# Patient Record
Sex: FEMALE | Race: WHITE | NOT HISPANIC OR LATINO
[De-identification: names, ages, dates, MRNs, and addresses within clinical notes are randomized per-mention and may not be internally consistent; named-entity substitution may affect disease eponyms.]

---

## 2019-02-20 ENCOUNTER — APPOINTMENT (OUTPATIENT)
Dept: GYNECOLOGIC ONCOLOGY | Facility: CLINIC | Age: 54
End: 2019-02-20
Payer: COMMERCIAL

## 2019-02-20 VITALS
SYSTOLIC BLOOD PRESSURE: 142 MMHG | BODY MASS INDEX: 39.93 KG/M2 | HEIGHT: 62 IN | OXYGEN SATURATION: 97 % | WEIGHT: 217 LBS | RESPIRATION RATE: 16 BRPM | DIASTOLIC BLOOD PRESSURE: 81 MMHG | HEART RATE: 93 BPM

## 2019-02-20 DIAGNOSIS — Z80.41 FAMILY HISTORY OF MALIGNANT NEOPLASM OF OVARY: ICD-10-CM

## 2019-02-20 DIAGNOSIS — F17.200 NICOTINE DEPENDENCE, UNSPECIFIED, UNCOMPLICATED: ICD-10-CM

## 2019-02-20 DIAGNOSIS — Z80.3 FAMILY HISTORY OF MALIGNANT NEOPLASM OF BREAST: ICD-10-CM

## 2019-02-20 DIAGNOSIS — Z83.3 FAMILY HISTORY OF DIABETES MELLITUS: ICD-10-CM

## 2019-02-20 DIAGNOSIS — R14.0 ABDOMINAL DISTENSION (GASEOUS): ICD-10-CM

## 2019-02-20 DIAGNOSIS — Z86.2 PERSONAL HISTORY OF DISEASES OF THE BLOOD AND BLOOD-FORMING ORGANS AND CERTAIN DISORDERS INVOLVING THE IMMUNE MECHANISM: ICD-10-CM

## 2019-02-20 DIAGNOSIS — N83.299 OTHER OVARIAN CYST, UNSPECIFIED SIDE: ICD-10-CM

## 2019-02-20 DIAGNOSIS — Z86.59 PERSONAL HISTORY OF OTHER MENTAL AND BEHAVIORAL DISORDERS: ICD-10-CM

## 2019-02-20 PROCEDURE — 99245 OFF/OP CONSLTJ NEW/EST HI 55: CPT | Mod: 25

## 2019-02-20 PROCEDURE — 76830 TRANSVAGINAL US NON-OB: CPT | Mod: 59

## 2019-02-20 PROCEDURE — 93976 VASCULAR STUDY: CPT | Mod: 59

## 2019-02-20 PROCEDURE — 76857 US EXAM PELVIC LIMITED: CPT | Mod: 59

## 2019-02-20 RX ORDER — ELASTIC BANDAGE 2"X2.2YD
BANDAGE TOPICAL
Refills: 0 | Status: ACTIVE | COMMUNITY

## 2019-02-20 RX ORDER — DIVALPROEX SODIUM 500 MG/1
TABLET, DELAYED RELEASE ORAL
Refills: 0 | Status: ACTIVE | COMMUNITY

## 2019-02-21 NOTE — END OF VISIT
[FreeTextEntry3] : Written by Crystal CHOW, acting as a scribe for Dr. Jenna Zhao.\par This note accurately reflects the work and decisions made by me.\par \par

## 2019-02-21 NOTE — ASSESSMENT
[FreeTextEntry1] : Discussed with the patient that I am highly suspicious of an ovarian malignancy. Her sonogram in my office today shows bilateral complex ovarian masses. I plan on ordering tumor markers as well as a baseline Ctscan. Discussed the need for surgery. Risks and benefits discussed.  \par \par I discussed at length with the patient the nature, purpose, risks, benefits, and alternatives of total abdominal hysterectomy and bilateral salpingo-oophorectomy via a vertical, midline incision, with bilateral pelvic and para-aortic lymphadenectomy and surgical staging (including possible omentectomy).  The patient understands the risks to include (but not be limited to) bowel injury, bleeding (with the possible need for transfusion), bladder or ureteral injury, infections, protracted wound closure, deep venous thrombosis, and kiran-operative death.  She is also aware of  the possibility of  a unrecognized surgical complication with need for subsequent re-exploration.  She also understands the rationale for surgical procedures such as omentectomy, or pelvic and para-aortic lymphadenectomy for the proper staging of a gynecological cancer.  She agrees to proceed.  She asked numerous questions which were answered to her satisfaction.  She understands the need for a pre-operative bowel preparation and agrees to comply with our instructions.

## 2019-02-21 NOTE — PHYSICAL EXAM
[Normal] : Bimanual Exam: Normal [de-identified] : Patient was interviewed and examined with chaperone present. Name of chaperone: Crystal Escoto

## 2019-02-21 NOTE — HISTORY OF PRESENT ILLNESS
[FreeTextEntry1] : This 52yo  LMP 2018 with history of triple negative breast cancer 14 years ago s/p chemotherapy and bilateral mastectomy. Patient is BRCA 1 positive and is referred here for evaluation of ovarian cysts. Patient desires a hysterectomy, BSO. Patient has been experiencing abdominal bloating which began about 2 months ago. Pelvic sonogram in 2019 showed a left ovary hyperechoic/solid area. Patient reports having a period in Dec 2018 after one year of no menses. \par \par Pap [id number] abnormal paps or HPV\par Colonoscopy-never\par Bone density-osteopenia

## 2019-02-21 NOTE — CHIEF COMPLAINT
[FreeTextEntry1] : [facility]\par \par [facility] [phone] at [address] [place] [phone]\par

## 2019-02-26 ENCOUNTER — MOBILE ON CALL (OUTPATIENT)
Age: 54
End: 2019-02-26

## 2019-02-28 ENCOUNTER — OUTPATIENT (OUTPATIENT)
Dept: OUTPATIENT SERVICES | Facility: HOSPITAL | Age: 54
LOS: 1 days | End: 2019-02-28
Payer: COMMERCIAL

## 2019-02-28 VITALS
HEIGHT: 62 IN | SYSTOLIC BLOOD PRESSURE: 159 MMHG | TEMPERATURE: 98 F | WEIGHT: 213.85 LBS | DIASTOLIC BLOOD PRESSURE: 80 MMHG | HEART RATE: 85 BPM | RESPIRATION RATE: 16 BRPM

## 2019-02-28 DIAGNOSIS — C50.919 MALIGNANT NEOPLASM OF UNSPECIFIED SITE OF UNSPECIFIED FEMALE BREAST: ICD-10-CM

## 2019-02-28 DIAGNOSIS — Z29.9 ENCOUNTER FOR PROPHYLACTIC MEASURES, UNSPECIFIED: ICD-10-CM

## 2019-02-28 DIAGNOSIS — Z01.818 ENCOUNTER FOR OTHER PREPROCEDURAL EXAMINATION: ICD-10-CM

## 2019-02-28 DIAGNOSIS — Z90.49 ACQUIRED ABSENCE OF OTHER SPECIFIED PARTS OF DIGESTIVE TRACT: Chronic | ICD-10-CM

## 2019-02-28 DIAGNOSIS — N83.299 OTHER OVARIAN CYST, UNSPECIFIED SIDE: ICD-10-CM

## 2019-02-28 DIAGNOSIS — Z90.13 ACQUIRED ABSENCE OF BILATERAL BREASTS AND NIPPLES: Chronic | ICD-10-CM

## 2019-02-28 LAB
ANION GAP SERPL CALC-SCNC: 11 MMOL/L — SIGNIFICANT CHANGE UP (ref 5–17)
BASOPHILS # BLD AUTO: 0 K/UL — SIGNIFICANT CHANGE UP (ref 0–0.2)
BASOPHILS NFR BLD AUTO: 0.5 % — SIGNIFICANT CHANGE UP (ref 0–2)
BLD GP AB SCN SERPL QL: SIGNIFICANT CHANGE UP
BUN SERPL-MCNC: 17 MG/DL — SIGNIFICANT CHANGE UP (ref 8–20)
CALCIUM SERPL-MCNC: 9.3 MG/DL — SIGNIFICANT CHANGE UP (ref 8.6–10.2)
CHLORIDE SERPL-SCNC: 99 MMOL/L — SIGNIFICANT CHANGE UP (ref 98–107)
CO2 SERPL-SCNC: 28 MMOL/L — SIGNIFICANT CHANGE UP (ref 22–29)
CREAT SERPL-MCNC: 0.69 MG/DL — SIGNIFICANT CHANGE UP (ref 0.5–1.3)
EOSINOPHIL # BLD AUTO: 0.1 K/UL — SIGNIFICANT CHANGE UP (ref 0–0.5)
EOSINOPHIL NFR BLD AUTO: 1.1 % — SIGNIFICANT CHANGE UP (ref 0–6)
GLUCOSE SERPL-MCNC: 93 MG/DL — SIGNIFICANT CHANGE UP (ref 70–115)
HBA1C BLD-MCNC: 6 % — HIGH (ref 4–5.6)
HCT VFR BLD CALC: 41.8 % — SIGNIFICANT CHANGE UP (ref 37–47)
HGB BLD-MCNC: 13.4 G/DL — SIGNIFICANT CHANGE UP (ref 12–16)
LYMPHOCYTES # BLD AUTO: 2.3 K/UL — SIGNIFICANT CHANGE UP (ref 1–4.8)
LYMPHOCYTES # BLD AUTO: 35.9 % — SIGNIFICANT CHANGE UP (ref 20–55)
MCHC RBC-ENTMCNC: 31.8 PG — HIGH (ref 27–31)
MCHC RBC-ENTMCNC: 32.1 G/DL — SIGNIFICANT CHANGE UP (ref 32–36)
MCV RBC AUTO: 99.3 FL — HIGH (ref 81–99)
MONOCYTES # BLD AUTO: 0.7 K/UL — SIGNIFICANT CHANGE UP (ref 0–0.8)
MONOCYTES NFR BLD AUTO: 10.4 % — HIGH (ref 3–10)
NEUTROPHILS # BLD AUTO: 3.3 K/UL — SIGNIFICANT CHANGE UP (ref 1.8–8)
NEUTROPHILS NFR BLD AUTO: 51.8 % — SIGNIFICANT CHANGE UP (ref 37–73)
PLATELET # BLD AUTO: 287 K/UL — SIGNIFICANT CHANGE UP (ref 150–400)
POTASSIUM SERPL-MCNC: 4.9 MMOL/L — SIGNIFICANT CHANGE UP (ref 3.5–5.3)
POTASSIUM SERPL-SCNC: 4.9 MMOL/L — SIGNIFICANT CHANGE UP (ref 3.5–5.3)
RBC # BLD: 4.21 M/UL — LOW (ref 4.4–5.2)
RBC # FLD: 13.5 % — SIGNIFICANT CHANGE UP (ref 11–15.6)
SODIUM SERPL-SCNC: 138 MMOL/L — SIGNIFICANT CHANGE UP (ref 135–145)
TYPE + AB SCN PNL BLD: SIGNIFICANT CHANGE UP
WBC # BLD: 6.3 K/UL — SIGNIFICANT CHANGE UP (ref 4.8–10.8)
WBC # FLD AUTO: 6.3 K/UL — SIGNIFICANT CHANGE UP (ref 4.8–10.8)

## 2019-02-28 PROCEDURE — 86850 RBC ANTIBODY SCREEN: CPT

## 2019-02-28 PROCEDURE — 83036 HEMOGLOBIN GLYCOSYLATED A1C: CPT

## 2019-02-28 PROCEDURE — 85027 COMPLETE CBC AUTOMATED: CPT

## 2019-02-28 PROCEDURE — 80048 BASIC METABOLIC PNL TOTAL CA: CPT

## 2019-02-28 PROCEDURE — 86900 BLOOD TYPING SEROLOGIC ABO: CPT

## 2019-02-28 PROCEDURE — 93005 ELECTROCARDIOGRAM TRACING: CPT

## 2019-02-28 PROCEDURE — G0463: CPT

## 2019-02-28 PROCEDURE — 86901 BLOOD TYPING SEROLOGIC RH(D): CPT

## 2019-02-28 PROCEDURE — 36415 COLL VENOUS BLD VENIPUNCTURE: CPT

## 2019-02-28 PROCEDURE — 93010 ELECTROCARDIOGRAM REPORT: CPT

## 2019-02-28 NOTE — H&P PST ADULT - PROBLEM SELECTOR PLAN 1
Total abdominal hysterectomy, bilateral salpingo oophorectomy omentectomy, pelvic para aortic lymphadenectomy staging.

## 2019-02-28 NOTE — H&P PST ADULT - FAMILY HISTORY
Sibling  Still living? Yes, Estimated age: 51-60  Family history of ovarian cancer, Age at diagnosis: 41-50  Family history of diabetes mellitus, Age at diagnosis: 41-50     Father  Still living? No  Family history of diabetes mellitus, Age at diagnosis: Age Unknown     Sibling  Still living? No  Family history of diabetes mellitus, Age at diagnosis: 41-50

## 2019-02-28 NOTE — H&P PST ADULT - ASSESSMENT
52 yo female with ovarian cyst.    NOBLEI VTE 2.0 SCORE [CLOT updated 2019]    AGE RELATED RISK FACTORS                                                       MOBILITY RELATED FACTORS  [ x ] Age 41-60 years                                            (1 Point)                    [ ] Bed rest                                                        (1 Point)  [ ] Age: 61-74 years                                           (2 Points)                  [ ] Plaster cast                                                   (2 Points)  [ ] Age= 75 years                                              (3 Points)                    [ ] Bed bound for more than 72 hours                 (2 Points)    DISEASE RELATED RISK FACTORS                                               GENDER SPECIFIC FACTORS  [ ] Edema in the lower extremities                       (1 Point)              [ ] Pregnancy                                                     (1 Point)  [ ] Varicose veins                                               (1 Point)                     [ ] Post-partum < 6 weeks                                   (1 Point)             [ x ] BMI > 25 Kg/m2                                            (1 Point)                     [ ] Hormonal therapy  or oral contraception          (1 Point)                 [ ] Sepsis (in the previous month)                        (1 Point)               [ ] History of pregnancy complications                 (1 point)  [ ] Pneumonia or serious lung disease                                               [ ] Unexplained or recurrent                     (1 Point)           (in the previous month)                               (1 Point)  [ ] Abnormal pulmonary function test                     (1 Point)                 SURGERY RELATED RISK FACTORS  [ ] Acute myocardial infarction                              (1 Point)               [ ]  Section                                             (1 Point)  [ ] Congestive heart failure (in the previous month)  (1 Point)      [ ] Minor surgery                                                  (1 Point)   [ ] Inflammatory bowel disease                             (1 Point)               [ ] Arthroscopic surgery                                        (2 Points)  [ ] Central venous access                                      (2 Points)                [ x] General surgery lasting more than 45 minutes (2 points)  [x ] Malignancy- Present or previous                   (2 Points)                [ ] Elective arthroplasty                                         (5 points)    [ ] Stroke (in the previous month)                          (5 Points)                                                                                                                                                           HEMATOLOGY RELATED FACTORS                                                 TRAUMA RELATED RISK FACTORS  [ ] Prior episodes of VTE                                     (3 Points)                [ ] Fracture of the hip, pelvis, or leg                       (5 Points)  [ ] Positive family history for VTE                         (3 Points)             [ ] Acute spinal cord injury (in the previous month)  (5 Points)  [ ] Prothrombin 09224 A                                     (3 Points)               [ ] Paralysis  (less than 1 month)                             (5 Points)  [ ] Factor V Leiden                                             (3 Points)                  [ ] Multiple Trauma within 1 month                        (5 Points)  [ ] Lupus anticoagulants                                     (3 Points)                                                           [ ] Anticardiolipin antibodies                               (3 Points)                                                       [ ] High homocysteine in the blood                      (3 Points)                                             [ ] Other congenital or acquired thrombophilia      (3 Points)                                                [ ] Heparin induced thrombocytopenia                  (3 Points)                                     Total Score [   6       ]    OPIOID RISK TOOL    DYLAN EACH BOX THAT APPLIES AND ADD TOTALS AT THE END    FAMILY HISTORY OF SUBSTANCE ABUSE                 FEMALE         MALE                                                Alcohol                             [ x ]1 pt          [  ]3pts                                               Illegal Drugs                     [  ]2 pts        [  ]3pts                                               Rx Drugs                           [  ]4 pts        [  ]4 pts    PERSONAL HISTORY OF SUBSTANCE ABUSE                                                                                          Alcohol                             [  ]3 pts       [  ]3 pts                                               Illegal Drugs                     [  ]4 pts        [  ]4 pts                                               Rx Drugs                           [  ]5 pts        [  ]5 pts    AGE BETWEEN 16-45 YEARS                                      [  ]1 pt         [  ]1 pt    HISTORY OF PREADOLESCENT   SEXUAL ABUSE                                                             [  ]3 pts        [  ]0pts    PSYCHOLOGICAL DISEASE                     ADD, OCD, Bipolar, Schizophrenia        [   x]2 pts         [  ]2 pts                      Depression                                               [  ]1 pt           [  ]1 pt           SCORING TOTAL   (add numbers and type here)              (*3*)                                     A score of 3 or lower indicated LOW risk for future opioid abuse  A score of 4 to 7 indicated moderate risk for future opioid abuse  A score of 8 or higher indicates a high risk for opioid abuse

## 2019-02-28 NOTE — H&P PST ADULT - NSANTHOSAYNRD_GEN_A_CORE
No. RUBIO screening performed.  STOP BANG Legend: 0-2 = LOW Risk; 3-4 = INTERMEDIATE Risk; 5-8 = HIGH Risk

## 2019-02-28 NOTE — PATIENT PROFILE ADULT - NSPROEDALEARNPREF_GEN_A_NUR
Instructed on pre-op instructions, tips for  safer surgery, pain management scale, pre-surgical infection prevention instructions, medical clearance - pending, understanding of all instructions verbalized./verbal instruction/written material/individual instruction

## 2019-03-05 ENCOUNTER — OTHER (OUTPATIENT)
Age: 54
End: 2019-03-05

## 2019-03-05 RX ORDER — PREDNISONE 50 MG/1
50 TABLET ORAL DAILY
Qty: 3 | Refills: 0 | Status: COMPLETED | COMMUNITY
Start: 2019-03-05 | End: 2019-03-06

## 2021-01-01 ENCOUNTER — LABORATORY RESULT (OUTPATIENT)
Age: 56
End: 2021-01-01

## 2021-01-01 ENCOUNTER — RESULT REVIEW (OUTPATIENT)
Age: 56
End: 2021-01-01

## 2021-01-01 ENCOUNTER — APPOINTMENT (OUTPATIENT)
Dept: HEMATOLOGY ONCOLOGY | Facility: CLINIC | Age: 56
End: 2021-01-01
Payer: COMMERCIAL

## 2021-01-01 ENCOUNTER — NON-APPOINTMENT (OUTPATIENT)
Age: 56
End: 2021-01-01

## 2021-01-01 ENCOUNTER — APPOINTMENT (OUTPATIENT)
Age: 56
End: 2021-01-01

## 2021-01-01 ENCOUNTER — OUTPATIENT (OUTPATIENT)
Dept: OUTPATIENT SERVICES | Facility: HOSPITAL | Age: 56
LOS: 1 days | Discharge: HOME | End: 2021-01-01

## 2021-01-01 ENCOUNTER — TRANSCRIPTION ENCOUNTER (OUTPATIENT)
Age: 56
End: 2021-01-01

## 2021-01-01 ENCOUNTER — APPOINTMENT (OUTPATIENT)
Dept: HEMATOLOGY ONCOLOGY | Facility: CLINIC | Age: 56
End: 2021-01-01
Payer: MEDICAID

## 2021-01-01 ENCOUNTER — APPOINTMENT (OUTPATIENT)
Dept: HEMATOLOGY ONCOLOGY | Facility: CLINIC | Age: 56
End: 2021-01-01

## 2021-01-01 ENCOUNTER — OUTPATIENT (OUTPATIENT)
Dept: OUTPATIENT SERVICES | Facility: HOSPITAL | Age: 56
LOS: 1 days | Discharge: HOME | End: 2021-01-01
Payer: MEDICAID

## 2021-01-01 VITALS
HEIGHT: 62 IN | HEART RATE: 121 BPM | OXYGEN SATURATION: 100 % | WEIGHT: 176.04 LBS | DIASTOLIC BLOOD PRESSURE: 84 MMHG | SYSTOLIC BLOOD PRESSURE: 133 MMHG | BODY MASS INDEX: 32.39 KG/M2

## 2021-01-01 VITALS
OXYGEN SATURATION: 98 % | DIASTOLIC BLOOD PRESSURE: 89 MMHG | HEART RATE: 88 BPM | HEIGHT: 62 IN | WEIGHT: 220 LBS | BODY MASS INDEX: 40.48 KG/M2 | SYSTOLIC BLOOD PRESSURE: 148 MMHG

## 2021-01-01 VITALS — DIASTOLIC BLOOD PRESSURE: 72 MMHG | HEART RATE: 133 BPM | SYSTOLIC BLOOD PRESSURE: 118 MMHG | OXYGEN SATURATION: 100 %

## 2021-01-01 VITALS — TEMPERATURE: 97.8 F

## 2021-01-01 DIAGNOSIS — Z12.73 ENCOUNTER FOR SCREENING FOR MALIGNANT NEOPLASM OF OVARY: ICD-10-CM

## 2021-01-01 DIAGNOSIS — C56.9 MALIGNANT NEOPLASM OF UNSPECIFIED OVARY: ICD-10-CM

## 2021-01-01 DIAGNOSIS — Z90.49 ACQUIRED ABSENCE OF OTHER SPECIFIED PARTS OF DIGESTIVE TRACT: Chronic | ICD-10-CM

## 2021-01-01 DIAGNOSIS — E86.0 DEHYDRATION: ICD-10-CM

## 2021-01-01 DIAGNOSIS — Z90.13 ACQUIRED ABSENCE OF BILATERAL BREASTS AND NIPPLES: Chronic | ICD-10-CM

## 2021-01-01 DIAGNOSIS — Z85.3 PERSONAL HISTORY OF MALIGNANT NEOPLASM OF BREAST: ICD-10-CM

## 2021-01-01 DIAGNOSIS — R11.2 NAUSEA WITH VOMITING, UNSPECIFIED: ICD-10-CM

## 2021-01-01 DIAGNOSIS — Z51.11 ENCOUNTER FOR ANTINEOPLASTIC CHEMOTHERAPY: ICD-10-CM

## 2021-01-01 DIAGNOSIS — Z15.09 GENETIC SUSCEPTIBILITY TO MALIGNANT NEOPLASM OF BREAST: ICD-10-CM

## 2021-01-01 DIAGNOSIS — Z15.01 GENETIC SUSCEPTIBILITY TO MALIGNANT NEOPLASM OF BREAST: ICD-10-CM

## 2021-01-01 DIAGNOSIS — R06.02 SHORTNESS OF BREATH: ICD-10-CM

## 2021-01-01 DIAGNOSIS — M79.89 OTHER SPECIFIED SOFT TISSUE DISORDERS: ICD-10-CM

## 2021-01-01 LAB
ANISOCYTOSIS BLD QL: SLIGHT — SIGNIFICANT CHANGE UP
ANISOCYTOSIS BLD QL: SLIGHT — SIGNIFICANT CHANGE UP
BASOPHILS # BLD AUTO: 0 K/UL — SIGNIFICANT CHANGE UP (ref 0–0.2)
BASOPHILS # BLD AUTO: 0.1 K/UL — SIGNIFICANT CHANGE UP (ref 0–0.2)
BASOPHILS NFR BLD AUTO: 0.9 % — SIGNIFICANT CHANGE UP (ref 0–2)
BASOPHILS NFR BLD AUTO: 1 % — SIGNIFICANT CHANGE UP (ref 0–2)
BASOPHILS NFR BLD AUTO: 1 % — SIGNIFICANT CHANGE UP (ref 0–2)
BASOPHILS NFR BLD AUTO: 1.1 % — SIGNIFICANT CHANGE UP (ref 0–2)
BASOPHILS NFR BLD AUTO: 1.6 % — SIGNIFICANT CHANGE UP (ref 0–2)
DACRYOCYTES BLD QL SMEAR: SLIGHT — SIGNIFICANT CHANGE UP
ELLIPTOCYTES BLD QL SMEAR: SLIGHT — SIGNIFICANT CHANGE UP
EOSINOPHIL # BLD AUTO: 0 K/UL — SIGNIFICANT CHANGE UP (ref 0–0.5)
EOSINOPHIL # BLD AUTO: 0 K/UL — SIGNIFICANT CHANGE UP (ref 0–0.5)
EOSINOPHIL # BLD AUTO: 0.1 K/UL — SIGNIFICANT CHANGE UP (ref 0–0.5)
EOSINOPHIL # BLD AUTO: 0.1 K/UL — SIGNIFICANT CHANGE UP (ref 0–0.5)
EOSINOPHIL # BLD AUTO: 0.2 K/UL — SIGNIFICANT CHANGE UP (ref 0–0.5)
EOSINOPHIL NFR BLD AUTO: 0.8 % — SIGNIFICANT CHANGE UP (ref 0–6)
EOSINOPHIL NFR BLD AUTO: 0.9 % — SIGNIFICANT CHANGE UP (ref 0–6)
EOSINOPHIL NFR BLD AUTO: 1.5 % — SIGNIFICANT CHANGE UP (ref 0–6)
EOSINOPHIL NFR BLD AUTO: 2.4 % — SIGNIFICANT CHANGE UP (ref 0–6)
EOSINOPHIL NFR BLD AUTO: 3 % — SIGNIFICANT CHANGE UP (ref 0–6)
GIANT PLATELETS BLD QL SMEAR: PRESENT — SIGNIFICANT CHANGE UP
HCT VFR BLD CALC: 35 % — SIGNIFICANT CHANGE UP (ref 34.5–45)
HCT VFR BLD CALC: 38.4 % — SIGNIFICANT CHANGE UP (ref 34.5–45)
HCT VFR BLD CALC: 39.6 % — SIGNIFICANT CHANGE UP (ref 34.5–45)
HCT VFR BLD CALC: 40.3 % — SIGNIFICANT CHANGE UP (ref 34.5–45)
HCT VFR BLD CALC: 41.2 % — SIGNIFICANT CHANGE UP (ref 34.5–45)
HGB BLD-MCNC: 11.1 G/DL — LOW (ref 11.5–15.5)
HGB BLD-MCNC: 11.4 G/DL — LOW (ref 11.5–15.5)
HGB BLD-MCNC: 12.2 G/DL — SIGNIFICANT CHANGE UP (ref 11.5–15.5)
HGB BLD-MCNC: 12.6 G/DL — SIGNIFICANT CHANGE UP (ref 11.5–15.5)
HGB BLD-MCNC: 12.8 G/DL — SIGNIFICANT CHANGE UP (ref 11.5–15.5)
LG PLATELETS BLD QL AUTO: SLIGHT — SIGNIFICANT CHANGE UP
LG PLATELETS BLD QL AUTO: SLIGHT — SIGNIFICANT CHANGE UP
LYMPHOCYTES # BLD AUTO: 0.9 K/UL — LOW (ref 1–3.3)
LYMPHOCYTES # BLD AUTO: 1.1 K/UL — SIGNIFICANT CHANGE UP (ref 1–3.3)
LYMPHOCYTES # BLD AUTO: 1.2 K/UL — SIGNIFICANT CHANGE UP (ref 1–3.3)
LYMPHOCYTES # BLD AUTO: 1.4 K/UL — SIGNIFICANT CHANGE UP (ref 1–3.3)
LYMPHOCYTES # BLD AUTO: 1.4 K/UL — SIGNIFICANT CHANGE UP (ref 1–3.3)
LYMPHOCYTES # BLD AUTO: 16.8 % — SIGNIFICANT CHANGE UP (ref 13–44)
LYMPHOCYTES # BLD AUTO: 23.6 % — SIGNIFICANT CHANGE UP (ref 13–44)
LYMPHOCYTES # BLD AUTO: 25 % — SIGNIFICANT CHANGE UP (ref 13–44)
LYMPHOCYTES # BLD AUTO: 25.4 % — SIGNIFICANT CHANGE UP (ref 13–44)
LYMPHOCYTES # BLD AUTO: 31 % — SIGNIFICANT CHANGE UP (ref 13–44)
MACROCYTES BLD QL: SIGNIFICANT CHANGE UP
MACROCYTES BLD QL: SIGNIFICANT CHANGE UP
MCHC RBC-ENTMCNC: 29.6 G/DL — LOW (ref 32–36)
MCHC RBC-ENTMCNC: 30.7 G/DL — LOW (ref 32–36)
MCHC RBC-ENTMCNC: 31 G/DL — LOW (ref 32–36)
MCHC RBC-ENTMCNC: 31.8 G/DL — LOW (ref 32–36)
MCHC RBC-ENTMCNC: 31.8 G/DL — LOW (ref 32–36)
MCHC RBC-ENTMCNC: 32.5 PG — SIGNIFICANT CHANGE UP (ref 27–34)
MCHC RBC-ENTMCNC: 33.4 PG — SIGNIFICANT CHANGE UP (ref 27–34)
MCHC RBC-ENTMCNC: 33.5 PG — SIGNIFICANT CHANGE UP (ref 27–34)
MCHC RBC-ENTMCNC: 34.8 PG — HIGH (ref 27–34)
MCHC RBC-ENTMCNC: 35.1 PG — HIGH (ref 27–34)
MCV RBC AUTO: 105.2 FL — HIGH (ref 80–100)
MCV RBC AUTO: 109.2 FL — HIGH (ref 80–100)
MCV RBC AUTO: 110 FL — HIGH (ref 80–100)
MCV RBC AUTO: 110.5 FL — HIGH (ref 80–100)
MCV RBC AUTO: 112.3 FL — HIGH (ref 80–100)
METAMYELOCYTES # FLD: 1 % — HIGH (ref 0–0)
MICROCYTES BLD QL: SLIGHT — SIGNIFICANT CHANGE UP
MICROCYTES BLD QL: SLIGHT — SIGNIFICANT CHANGE UP
MONOCYTES # BLD AUTO: 0.5 K/UL — SIGNIFICANT CHANGE UP (ref 0–0.9)
MONOCYTES # BLD AUTO: 0.5 K/UL — SIGNIFICANT CHANGE UP (ref 0–0.9)
MONOCYTES # BLD AUTO: 0.7 K/UL — SIGNIFICANT CHANGE UP (ref 0–0.9)
MONOCYTES NFR BLD AUTO: 11.5 % — SIGNIFICANT CHANGE UP (ref 2–14)
MONOCYTES NFR BLD AUTO: 11.6 % — SIGNIFICANT CHANGE UP (ref 2–14)
MONOCYTES NFR BLD AUTO: 14.3 % — HIGH (ref 2–14)
MONOCYTES NFR BLD AUTO: 16.8 % — HIGH (ref 2–14)
MONOCYTES NFR BLD AUTO: 18 % — HIGH (ref 2–14)
MYELOCYTES NFR BLD: 1 % — HIGH (ref 0–0)
NEUTROPHILS # BLD AUTO: 2 K/UL — SIGNIFICANT CHANGE UP (ref 1.8–7.4)
NEUTROPHILS # BLD AUTO: 2.2 K/UL — SIGNIFICANT CHANGE UP (ref 1.8–7.4)
NEUTROPHILS # BLD AUTO: 2.8 K/UL — SIGNIFICANT CHANGE UP (ref 1.8–7.4)
NEUTROPHILS # BLD AUTO: 3.5 K/UL — SIGNIFICANT CHANGE UP (ref 1.8–7.4)
NEUTROPHILS # BLD AUTO: 3.5 K/UL — SIGNIFICANT CHANGE UP (ref 1.8–7.4)
NEUTROPHILS NFR BLD AUTO: 49.6 % — SIGNIFICANT CHANGE UP (ref 43–77)
NEUTROPHILS NFR BLD AUTO: 51 % — SIGNIFICANT CHANGE UP (ref 43–77)
NEUTROPHILS NFR BLD AUTO: 60.6 % — SIGNIFICANT CHANGE UP (ref 43–77)
NEUTROPHILS NFR BLD AUTO: 61.5 % — SIGNIFICANT CHANGE UP (ref 43–77)
NEUTROPHILS NFR BLD AUTO: 67.2 % — SIGNIFICANT CHANGE UP (ref 43–77)
OVALOCYTES BLD QL SMEAR: SLIGHT — SIGNIFICANT CHANGE UP
OVALOCYTES BLD QL SMEAR: SLIGHT — SIGNIFICANT CHANGE UP
PLAT MORPH BLD: NORMAL — SIGNIFICANT CHANGE UP
PLAT MORPH BLD: NORMAL — SIGNIFICANT CHANGE UP
PLATELET # BLD AUTO: 190 K/UL — SIGNIFICANT CHANGE UP (ref 150–400)
PLATELET # BLD AUTO: 206 K/UL — SIGNIFICANT CHANGE UP (ref 150–400)
PLATELET # BLD AUTO: 228 K/UL — SIGNIFICANT CHANGE UP (ref 150–400)
PLATELET # BLD AUTO: 240 K/UL — SIGNIFICANT CHANGE UP (ref 150–400)
PLATELET # BLD AUTO: 260 K/UL — SIGNIFICANT CHANGE UP (ref 150–400)
POIKILOCYTOSIS BLD QL AUTO: SLIGHT — SIGNIFICANT CHANGE UP
POIKILOCYTOSIS BLD QL AUTO: SLIGHT — SIGNIFICANT CHANGE UP
POLYCHROMASIA BLD QL SMEAR: SLIGHT — SIGNIFICANT CHANGE UP
RBC # BLD: 3.33 M/UL — LOW (ref 3.8–5.2)
RBC # BLD: 3.49 M/UL — LOW (ref 3.8–5.2)
RBC # BLD: 3.52 M/UL — LOW (ref 3.8–5.2)
RBC # BLD: 3.65 M/UL — LOW (ref 3.8–5.2)
RBC # BLD: 3.77 M/UL — LOW (ref 3.8–5.2)
RBC # FLD: 15.9 % — HIGH (ref 10.3–14.5)
RBC # FLD: 15.9 % — HIGH (ref 10.3–14.5)
RBC # FLD: 16.1 % — HIGH (ref 10.3–14.5)
RBC # FLD: 16.2 % — HIGH (ref 10.3–14.5)
RBC # FLD: 16.9 % — HIGH (ref 10.3–14.5)
RBC BLD AUTO: SIGNIFICANT CHANGE UP
RBC BLD AUTO: SIGNIFICANT CHANGE UP
SMUDGE CELLS # BLD: PRESENT — SIGNIFICANT CHANGE UP
SURGICAL PATHOLOGY STUDY: SIGNIFICANT CHANGE UP
WBC # BLD: 3.9 K/UL — SIGNIFICANT CHANGE UP (ref 3.8–10.5)
WBC # BLD: 4.4 K/UL — SIGNIFICANT CHANGE UP (ref 3.8–10.5)
WBC # BLD: 4.7 K/UL — SIGNIFICANT CHANGE UP (ref 3.8–10.5)
WBC # BLD: 5.2 K/UL — SIGNIFICANT CHANGE UP (ref 3.8–10.5)
WBC # BLD: 5.8 K/UL — SIGNIFICANT CHANGE UP (ref 3.8–10.5)
WBC # FLD AUTO: 3.9 K/UL — SIGNIFICANT CHANGE UP (ref 3.8–10.5)
WBC # FLD AUTO: 4.4 K/UL — SIGNIFICANT CHANGE UP (ref 3.8–10.5)
WBC # FLD AUTO: 4.7 K/UL — SIGNIFICANT CHANGE UP (ref 3.8–10.5)
WBC # FLD AUTO: 5.2 K/UL — SIGNIFICANT CHANGE UP (ref 3.8–10.5)
WBC # FLD AUTO: 5.8 K/UL — SIGNIFICANT CHANGE UP (ref 3.8–10.5)

## 2021-01-01 PROCEDURE — 99214 OFFICE O/P EST MOD 30 MIN: CPT

## 2021-01-01 PROCEDURE — 99442: CPT

## 2021-01-01 PROCEDURE — 99072 ADDL SUPL MATRL&STAF TM PHE: CPT

## 2021-01-01 PROCEDURE — 88321 CONSLTJ&REPRT SLD PREP ELSWR: CPT

## 2021-01-01 PROCEDURE — 99443: CPT

## 2021-01-01 PROCEDURE — 99203 OFFICE O/P NEW LOW 30 MIN: CPT

## 2021-01-01 PROCEDURE — 99441: CPT

## 2021-01-01 PROCEDURE — 93010 ELECTROCARDIOGRAM REPORT: CPT

## 2021-01-01 RX ORDER — DIVALPROEX SODIUM 500 MG/1
3 TABLET, DELAYED RELEASE ORAL
Qty: 0 | Refills: 0 | DISCHARGE

## 2021-01-01 RX ORDER — PROCHLORPERAZINE MALEATE 10 MG/1
10 TABLET ORAL EVERY 6 HOURS
Qty: 120 | Refills: 0 | Status: DISCONTINUED | COMMUNITY
Start: 2021-01-01 | End: 2021-01-01

## 2021-01-01 RX ORDER — ERGOCALCIFEROL 1.25 MG/1
1 CAPSULE ORAL
Qty: 0 | Refills: 0 | DISCHARGE

## 2021-01-01 RX ORDER — METOCLOPRAMIDE 10 MG/1
10 TABLET ORAL EVERY 6 HOURS
Qty: 40 | Refills: 0 | Status: ACTIVE | COMMUNITY
Start: 2021-01-01 | End: 1900-01-01

## 2021-01-01 RX ORDER — LORAZEPAM 0.5 MG/1
0.5 TABLET ORAL EVERY 8 HOURS
Qty: 21 | Refills: 0 | Status: ACTIVE | COMMUNITY
Start: 2021-01-01 | End: 1900-01-01

## 2021-01-01 RX ORDER — PREDNISONE 50 MG/1
50 TABLET ORAL
Qty: 3 | Refills: 0 | Status: ACTIVE | COMMUNITY
Start: 2021-01-01 | End: 1900-01-01

## 2021-01-01 RX ORDER — ONDANSETRON 8 MG/1
8 TABLET ORAL
Qty: 30 | Refills: 5 | Status: ACTIVE | COMMUNITY
Start: 2021-01-01 | End: 1900-01-01

## 2021-03-10 NOTE — H&P PST ADULT - CIGARETTES, PACKS/DAY
location identified, draped/prepped, sterile technique used, needle inserted/introduced/area cleaned in sterile fashion
0.5

## 2021-03-25 PROBLEM — F31.9 BIPOLAR DISORDER, UNSPECIFIED: Chronic | Status: ACTIVE | Noted: 2019-02-28

## 2021-03-25 PROBLEM — C50.919 MALIGNANT NEOPLASM OF UNSPECIFIED SITE OF UNSPECIFIED FEMALE BREAST: Chronic | Status: ACTIVE | Noted: 2019-02-28

## 2021-04-05 PROBLEM — Z85.3 HISTORY OF MALIGNANT NEOPLASM OF BREAST: Status: RESOLVED | Noted: 2019-02-20 | Resolved: 2021-01-01

## 2021-04-05 NOTE — HISTORY OF PRESENT ILLNESS
[de-identified] : Patient is a 56 year old female, currently under the care of [facility] Dr. Porter Vázquez for ovarian cancer. Patient is here for a second opinion. History of triple negative breast cancer in 2006 and underwent chemotherapy and bilateral mastectomy. Patient is BRCA 1 positive. \par Stage Mónica Ovarian cancer diagnosed in 2019 s/p carbo/taxol 5/2019-9/2019 then maintenance Olaparib 11/19. Ca 125 7799 in 2/2019. Patient reports ca 125 was declining on Olaparib then patient started experiencing dyspnea and a rash with Olaparib and dosage was reduced, patient then reports Ca 125 began to rise and Olaparib has been increased to 150mg in the morning and 300mg at night since then and patient has been tolerating current dose. Ca 125 is 656 on 3/9/21. \par Patient currently has no complaints other than mild chronic fatigue. 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

## 2021-04-05 NOTE — REVIEW OF SYSTEMS
[Negative] : Neurological [Fever] : no fever [Recent Change In Weight] : ~T no recent weight change [Abdominal Pain] : no abdominal pain [Vomiting] : no vomiting [Constipation] : no constipation [Diarrhea] : no diarrhea

## 2021-04-05 NOTE — ASSESSMENT
[FreeTextEntry1] : Stage Mónica Ovarian cancer diagnosed in 2019 s/p carbo/taxol 5/2019-9/2019 on maintenance Olaparib \par -Ca 125 is 656 on 3/9/21\par -Patient reports she is planned for a reestaging CT abdomen/pelvis this month\par -Patient does not want chemotherapy at this time, discussed risk and benefit \par -If patient progresses on Olaparib would consider adding Avastin and/or change PARP Inhibitor (nirapirib), discussed all treatment options including chemotherapy, Avastin and/or change in PARP Inhibitor, risk and benefit and side effects with patient, patient verbalized understanding. \par -Discussed options with patient, patient agrees to continue care at [facility] with Dr. Porter Vázquez.

## 2021-04-05 NOTE — PHYSICAL EXAM
[Fully active, able to carry on all pre-disease performance without restriction] : Status 0 - Fully active, able to carry on all pre-disease performance without restriction [Normal] : normoactive bowel sounds, soft and nontender, no hepatosplenomegaly or masses appreciated [de-identified] : Bilateral mastectomy

## 2021-07-06 PROBLEM — Z15.01 BRCA1 POSITIVE: Status: ACTIVE | Noted: 2019-02-20

## 2021-07-06 NOTE — ASSESSMENT
[FreeTextEntry1] : Stage Mónica ProThelial ovarian cancer diagnosed in 2019, underwent going TARAS/BSO omentectomy and node dissection.  Treated with carboplatin and Taxol from 519 2/9/2019 and then maintenance olaparib.  Recent rise in CA-125 and the development of malignant pleural effusion.  Olaparib has been discontinued.  Now on Eliquis for pulmonary emboli.\par Now requesting change in care to our group.\par Patient continues to request that we hold off on chemotherapy.\par She already has niraparib (Zejula) and we have agreed to proceed with that medication in conjunction with bevacizumab.\par We will follow after 2-3 cycles.\par Continuous monitoring monthly of Ca125.\par \par 30 minutes were spent reviewing past history, discussing current clinical status, and planning future care.

## 2021-07-06 NOTE — REASON FOR VISIT
[Home] : at home, [unfilled] , at the time of the visit. [Medical Office: ([facility])___] : at the medical office located in  [Verbal consent obtained from patient] : the patient, [unfilled] [Follow-Up Visit] : a follow-up [FreeTextEntry2] : Ovarian cancer

## 2021-07-06 NOTE — HISTORY OF PRESENT ILLNESS
[de-identified] : \par \par Patient is a 56 year old female, previously under the care of [facility] Dr. Porter Vázquez for ovarian cancer. Patient is here for a second opinion. History of triple negative breast cancer in 2006 and underwent chemotherapy and bilateral mastectomy. Patient is BRCA 1 positive. \par Stage Mónica Ovarian cancer diagnosed in 2019 s/p carbo/taxol 5/2019-9/2019 then maintenance Olaparib 11/19. Ca 125 7799 in 2/2019. Patient reports ca 125 was declining on Olaparib then patient started experiencing dyspnea and a rash with Olaparib and dosage was reduced, patient then reports Ca 125 began to rise and Olaparib has been increased to 150mg in the morning and 300mg at night since then and patient has been tolerating current dose. Ca 125 is 656 on 3/9/21. \par Patient currently has no complaints other than mild chronic fatigue. 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 \par \par  [de-identified] : Recently developed increasing shortness of breath and found to have pleural effusion with positive cytology.  She was seen by Dr. Jackson at [place] but now wishes to follow with our group at [facility].\par We are in the process of retrieving records from [place] but according to the patient her CA-125 had risen to 1992.  Olaparib was discontinued.  Recent course of steroids did not help with dyspnea improvement, but following thoracentesis her breathing was somewhat better.\par Pulmonary emboli were documented on a recent chest CT scan and Roxanne is now on Eliquis 5 mg twice daily.\par

## 2021-07-06 NOTE — REVIEW OF SYSTEMS
[Fatigue] : fatigue [SOB on Exertion] : shortness of breath during exertion [Negative] : Heme/Lymph [Fever] : no fever [FreeTextEntry7] : Nausea, taking Reglan.

## 2021-07-15 PROBLEM — M79.89 LEFT LEG SWELLING: Status: ACTIVE | Noted: 2021-01-01

## 2021-07-15 PROBLEM — R06.02 SHORTNESS OF BREATH AT REST: Status: ACTIVE | Noted: 2021-01-01

## 2021-07-19 NOTE — HISTORY OF PRESENT ILLNESS
[de-identified] : Patient is a 56 year old female, previously under the care of [facility] Dr. Porter Vázquez for ovarian cancer. Patient is here for a second opinion. History of triple negative breast cancer in 2006 and underwent chemotherapy and bilateral mastectomy. Patient is BRCA 1 positive. \par Stage Mónica Ovarian cancer diagnosed in 2019 s/p carbo/taxol 5/2019-9/2019 then maintenance Olaparib 11/19. Ca 125 7799 in 2/2019. Patient reports ca 125 was declining on Olaparib then patient started experiencing dyspnea and a rash with Olaparib and dosage was reduced, patient then reports Ca 125 began to rise and Olaparib has been increased to 150mg in the morning and 300mg at night since then and patient has been tolerating current dose. Ca 125 is 656 on 3/9/21. \par Patient currently has no complaints other than mild chronic fatigue. 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 \par \par Patient developed increasing shortness of breath and found to have pleural effusion with positive cytology.  She was seen by Dr. Jackson at [place] but now wishes to follow with our group at [facility].\par We are in the process of retrieving records from [place] but according to the patient her CA-125 had risen to 1992.  Olaparib was discontinued.  Recent course of steroids did not help with dyspnea improvement, but following thoracentesis her breathing was somewhat better.\par Pulmonary emboli were documented on a recent chest CT scan and Roxanne is now on Eliquis 5 mg twice daily. [de-identified] : Patient presents to treatment room for first cycle of Ziravbev. Patient c/o worsening SOB on exertion for the past week. PAtient recently evaluated at [facility] diagnosed with PE on Eliquis and had thoracentesis performed. Patient also noted to be tachycardic on exam.\par PAtient c/o nausea and vomiting, unable to keep any solid food down\par On exam, left ankle noted to be swollen, non tender, no erythema\par PAtient has no started Zejula as of yet. States she feels "lousy" \par \par \par \par

## 2021-07-19 NOTE — PHYSICAL EXAM
[Normal] : normoactive bowel sounds, soft and nontender, no hepatosplenomegaly or masses appreciated [de-identified] : ill appearing [de-identified] : Diminished BS left lower lobe [de-identified] : Distended [de-identified] : Left ankle swolle, no erythema non tender

## 2021-07-19 NOTE — ASSESSMENT
[FreeTextEntry1] : Stage Mónica ovarian cancer diagnosed in 2019, underwent going TARAS/BSO omentectomy and node dissection.  Treated with carboplatin and Taxol from 519 2/9/2019 and then maintenance olaparib.  Recent rise in CA-125 and the development of malignant pleural effusion.  Olaparib has been discontinued.  Now on Eliquis for pulmonary emboli.\par She already has niraparib (Zejula) and we have agreed to proceed with that medication in conjunction with bevacizumab.\par Continuous monitoring monthly of Ca125.\par \par - Case discussed with Dr. Sher\par - Will hold treatment today\par - Start Compazine on hopes to provide symptom relief\par - Patient possible dehydrated. STAT Xray order to r/o pleural effusion/PNA, pending results may need IV fluids\par - STAT Venous doppler ordered  to r/o DVT\par - US abd  to further evaluate possible ascites\par - Testing unable to be performed same day due to patient's ins. Will have testing done tomorrow.  Patient advised if symptoms worsen to go to ED. Patient agrees with plan\par \par \par

## 2021-07-19 NOTE — REVIEW OF SYSTEMS
[Fatigue] : fatigue [SOB on Exertion] : shortness of breath during exertion [Negative] : Heme/Lymph [Vomiting] : vomiting [Fever] : no fever [Wheezing] : no wheezing [Cough] : no cough [Abdominal Pain] : no abdominal pain [Diarrhea] : no diarrhea [FreeTextEntry7] : Nausea, taking Reglan.

## 2021-07-25 NOTE — PHYSICAL EXAM
[Normal] : normal appearance, no rash, nodules, vesicles, ulcers, erythema [de-identified] : Diminished BS left lower lobe

## 2021-07-25 NOTE — ASSESSMENT
[FreeTextEntry1] : Stage Mónica ovarian cancer diagnosed in 2019, underwent going TARAS/BSO omentectomy and node dissection.  Treated with carboplatin and Taxol from 519 2/9/2019 and then maintenance olaparib.  Recent rise in CA-125 and the development of malignant pleural effusion.  Olaparib has been discontinued.  Now on Eliquis for pulmonary emboli.\par She already has niraparib (Zejula) and we have agreed to proceed with that medication in conjunction with bevacizumab.\par Continuous monitoring monthly of Ca125.\par \par \par - Patient to start treatment today, will received Zirabev and to start Zejula\par - Continue Compazine \par - Advised patient if SOB worsens to call office, can arrange thoracentesis with IR\par - F/U in 3-4 weeks\par \par \par

## 2021-07-25 NOTE — REVIEW OF SYSTEMS
[Fatigue] : fatigue [SOB on Exertion] : shortness of breath during exertion [Negative] : Heme/Lymph [Fever] : no fever [Wheezing] : no wheezing [Cough] : no cough [Abdominal Pain] : no abdominal pain [Vomiting] : no vomiting [Constipation] : no constipation [Diarrhea] : no diarrhea [FreeTextEntry7] : Nausea

## 2021-07-25 NOTE — HISTORY OF PRESENT ILLNESS
[de-identified] : Patient is a 56 year old female, previously under the care of [facility] Dr. Porter Vázquez for ovarian cancer. Patient is here for a second opinion. History of triple negative breast cancer in 2006 and underwent chemotherapy and bilateral mastectomy. Patient is BRCA 1 positive. \par Stage Mónica Ovarian cancer diagnosed in 2019 s/p carbo/taxol 5/2019-9/2019 then maintenance Olaparib 11/19. Ca 125 7799 in 2/2019. Patient reports ca 125 was declining on Olaparib then patient started experiencing dyspnea and a rash with Olaparib and dosage was reduced, patient then reports Ca 125 began to rise and Olaparib has been increased to 150mg in the morning and 300mg at night since then and patient has been tolerating current dose. Ca 125 is 656 on 3/9/21. \par Patient currently has no complaints other than mild chronic fatigue. 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 \par \par Patient developed increasing shortness of breath and found to have pleural effusion with positive cytology.  She was seen by Dr. Jackson at [place] but now wishes to follow with our group at [facility].\par We are in the process of retrieving records from [place] but according to the patient her CA-125 had risen to 1992.  Olaparib was discontinued.  Recent course of steroids did not help with dyspnea improvement, but following thoracentesis her breathing was somewhat better.\par Pulmonary emboli were documented on a recent chest CT scan and Roxanne is now on Eliquis 5 mg twice daily. [de-identified] : Patient presents to office for follow up. Patient went to [facility] on 7/18/21. Patient had thoracentesis performed ( removed 2 L) and was sent home with O2. Venous doppler was performed as well , ruled out DVT\par Patient states breathing is much better and she feels better. Admits nausea. She is anxious to start treatment\par Denies fever/chills, rash, vomiting, diarrhea,constipation,  abdominal pain, bleeding, easy bruising or visual changes, chest pain, cough,   neuropathy, LE edema.\par \par \par \par \par

## 2021-08-24 NOTE — HISTORY OF PRESENT ILLNESS
[Home] : at home, [unfilled] , at the time of the visit. [Medical Office: ([facility])___] : at the medical office located in  [Verbal consent obtained from patient] : the patient, [unfilled] [de-identified] : Patient is a 56 year old female, previously under the care of [facility] Dr. Porter Vázquez for ovarian cancer. Patient is here for a second opinion. History of triple negative breast cancer in 2006 and underwent chemotherapy and bilateral mastectomy. Patient is BRCA 1 positive. \par Stage Mónica Ovarian cancer diagnosed in 2019 s/p carbo/taxol 5/2019-9/2019 then maintenance Olaparib 11/19. Ca 125 7799 in 2/2019. Patient reports ca 125 was declining on Olaparib then patient started experiencing dyspnea and a rash with Olaparib and dosage was reduced, patient then reports Ca 125 began to rise and Olaparib has been increased to 150mg in the morning and 300mg at night since then and patient has been tolerating current dose. Ca 125 is 656 on 3/9/21. \par Patient currently has no complaints other than mild chronic fatigue. 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 \par \par Patient developed increasing shortness of breath and found to have pleural effusion with positive cytology.  She was seen by Dr. Jackson at [place] but now wishes to follow with our group at [facility].\par We are in the process of retrieving records from [place] but according to the patient her CA-125 had risen to 1992.  Olaparib was discontinued.  Recent course of steroids did not help with dyspnea improvement, but following thoracentesis her breathing was somewhat better.\par Pulmonary emboli were documented on a recent chest CT scan and Roxanne is now on Eliquis 5 mg twice daily. [de-identified] : Patient went to [facility] on 7/18/21. Patient had thoracentesis performed ( removed 2 L) and was sent home with O2. \par Patient returned to [place] ED on 7/26/21-7/30/21. Left sided Aspira pleural cather was placed. Patient admist ~200ml draining every day\par Patient using O2 at home, able to ambulate in house on her own. Nausea is intermittent. Admits mild decrease in appetite.\par Tolerating Zejula/Zirabev faitl well\par Denies fever/chills, rash, vomiting, diarrhea,  abdominal pain, bleeding, easy bruising or visual changes, chest pain, cough,   neuropathy, LE edema.\par \par \par \par \par

## 2021-08-24 NOTE — ASSESSMENT
[FreeTextEntry1] : Stage Mónica ovarian cancer diagnosed in 2019, underwent going TARAS/BSO omentectomy and node dissection.  Treated with carboplatin and Taxol from 519 2/9/2019 and then maintenance olaparib.  Recent rise in CA-125 and the development of malignant pleural effusion.  Olaparib has been discontinued.  Now on Eliquis for pulmonary emboli.\par She already has niraparib (Zejula) and we have agreed to proceed with that medication in conjunction with bevacizumab.\par Continuous monitoring monthly of Ca125.\par \par \par - Patient started Zirabev on 7/22/21, to receive third tx on 9/2/21\par - Patient continues with Zejula with minimal s/e\par - Ca125: ( 7/15/21) 5463--> ( 7/22/21) 4919--> (8/12/21) <2\par - Continue Compazine for nausea \par - F/U in 2 weeks\par \par \par

## 2021-09-09 NOTE — ASSESSMENT
[FreeTextEntry1] : Stage Mónica ovarian cancer diagnosed in 2019, underwent going TARAS/BSO omentectomy and node dissection.  Treated with carboplatin and Taxol from 519 2/9/2019 and then maintenance olaparib.  Recent rise in CA-125 and the development of malignant pleural effusion.  Olaparib has been discontinued.  Now on Eliquis for pulmonary emboli.\par She already has niraparib (Zejula) and we have agreed to proceed with that medication in conjunction with bevacizumab.\par Continuous monitoring monthly of Ca125.\par \par \par - Patient started Zirabev on 7/22/21, to receive third tx on 9/2/21\par - Patient continues with Zejula \par - Start Ativan PRN nausea\par - Ca125: ( 7/15/21) 5463--> ( 7/22/21) 4919--> (8/12/21) <2\par - F/U in 4 weeks\par \par \par

## 2021-09-09 NOTE — HISTORY OF PRESENT ILLNESS
[de-identified] : Patient is a 56 year old female, previously under the care of [facility] Dr. Porter Vázquez for ovarian cancer. Patient is here for a second opinion. History of triple negative breast cancer in 2006 and underwent chemotherapy and bilateral mastectomy. Patient is BRCA 1 positive. \par Stage Mónica Ovarian cancer diagnosed in 2019 s/p carbo/taxol 5/2019-9/2019 then maintenance Olaparib 11/19. Ca 125 7799 in 2/2019. Patient reports ca 125 was declining on Olaparib then patient started experiencing dyspnea and a rash with Olaparib and dosage was reduced, patient then reports Ca 125 began to rise and Olaparib has been increased to 150mg in the morning and 300mg at night since then and patient has been tolerating current dose. Ca 125 is 656 on 3/9/21. \par Patient currently has no complaints other than mild chronic fatigue. 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 \par \par Patient developed increasing shortness of breath and found to have pleural effusion with positive cytology.  She was seen by Dr. Jackson at [place] but now wishes to follow with our group at [facility].\par We are in the process of retrieving records from [place] but according to the patient her CA-125 had risen to 1992.  Olaparib was discontinued.  Recent course of steroids did not help with dyspnea improvement, but following thoracentesis her breathing was somewhat better.\par Pulmonary emboli were documented on a recent chest CT scan and Roxanne is now on Eliquis 5 mg twice daily.\par \par Patient went to [facility] on 7/18/21. Patient had thoracentesis performed ( removed 2 L) and was sent home with O2. \par Patient returned to [place] ED on 7/26/21-7/30/21. Left sided Aspira pleural cather was placed.  [de-identified] : Patient presents to office for follow. Patient states today is a good day\par Reports left pleural catheter is draining less, ~85 ml. Patient using O2 at home, able to ambulate in house on her own. \par Nausea has worsened. Currently taking Reglan with no relief.  \par Denies fever/chills, rash, diarrhea,  abdominal pain, bleeding, easy bruising or visual changes, chest pain, cough,   neuropathy, LE edema.\par \par \par \par \par

## 2021-09-20 PROBLEM — C56.9 OVARIAN CANCER: Status: ACTIVE | Noted: 2021-01-01

## 2021-09-20 NOTE — ASSESSMENT
[FreeTextEntry1] : Stage Mónica ovarian cancer diagnosed in 2019, underwent going TARAS/BSO omentectomy and node dissection.  Treated with carboplatin and Taxol from 519 2/9/2019 and then maintenance olaparib.  Recent rise in CA-125 and the development of malignant pleural effusion.  Olaparib has been discontinued.  Now on Eliquis for pulmonary emboli.\par She begab therapy with niraparib (Zejula) but that drug was discontinued due to intolerance.\par Continuous monitoring monthly of Ca125.\par \par \par - Patient started Zirabev on 7/22/21, to receive third tx on 9/2/21\par - Patient continues with Zejula \par - Start Ativan PRN nausea\par - Ca125: ( 7/15/21) 5463--> ( 7/22/21) 4919--> (8/12/21) <2\par - F/U in 4 weeks\par \par \par

## 2021-09-20 NOTE — REASON FOR VISIT
[Follow-Up Visit] : a follow-up [Home] : at home, [unfilled] , at the time of the visit. [Medical Office: ([facility])___] : at the medical office located in  [Verbal consent obtained from patient] : the patient, [unfilled] [FreeTextEntry2] : Ovarian cancer

## 2021-09-20 NOTE — HISTORY OF PRESENT ILLNESS
[de-identified] : Patient is a 56 year old female, previously under the care of [facility] Dr. Porter Vázquez for ovarian cancer. Patient is here for a second opinion. History of triple negative breast cancer in 2006 and underwent chemotherapy and bilateral mastectomy. Patient is BRCA 1 positive. \par Stage Mónica Ovarian cancer diagnosed in 2019 s/p carbo/taxol 5/2019-9/2019 then maintenance Olaparib 11/19. Ca 125 7799 in 2/2019. Patient reports ca 125 was declining on Olaparib then patient started experiencing dyspnea and a rash with Olaparib and dosage was reduced, patient then reports Ca 125 began to rise and Olaparib has been increased to 150mg in the morning and 300mg at night since then and patient has been tolerating current dose. Ca 125 is 656 on 3/9/21. \par Patient currently has no complaints other than mild chronic fatigue. 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 \par \par Patient developed increasing shortness of breath and found to have pleural effusion with positive cytology.  She was seen by Dr. Jackson at [place] but now wishes to follow with our group at [facility].\par We are in the process of retrieving records from [place] but according to the patient her CA-125 had risen to 1992.  Olaparib was discontinued.  Recent course of steroids did not help with dyspnea improvement, but following thoracentesis her breathing was somewhat better.\par Pulmonary emboli were documented on a recent chest CT scan and Roxanne is now on Eliquis 5 mg twice daily.\par \par Patient went to [facility] on 7/18/21. Patient had thoracentesis performed ( removed 2 L) and was sent home with O2. \par Patient returned to [place] ED on 7/26/21-7/30/21. Left sided Aspira pleural cather was placed.  [de-identified] : Therapy continues with bevacizumab.  Zejula was discontinued secondary to intolerance.\par Reports left pleural catheter is draining less, ~85 ml. Patient using O2 at home, able to ambulate in house on her own. \par Nausea has worsened. Currently taking Reglan with no relief.  \par Denies fever/chills, rash, diarrhea,  abdominal pain, bleeding, easy bruising or visual changes, chest pain, cough,   neuropathy, LE edema.\par \par \par \par \par

## 2021-11-23 ENCOUNTER — OUTPATIENT (OUTPATIENT)
Dept: OUTPATIENT SERVICES | Facility: HOSPITAL | Age: 56
LOS: 1 days | Discharge: HOME | End: 2021-11-23

## 2021-11-23 DIAGNOSIS — C56.9 MALIGNANT NEOPLASM OF UNSPECIFIED OVARY: ICD-10-CM

## 2021-11-23 DIAGNOSIS — Z90.13 ACQUIRED ABSENCE OF BILATERAL BREASTS AND NIPPLES: Chronic | ICD-10-CM

## 2021-11-23 DIAGNOSIS — Z90.49 ACQUIRED ABSENCE OF OTHER SPECIFIED PARTS OF DIGESTIVE TRACT: Chronic | ICD-10-CM

## 2021-11-26 ENCOUNTER — APPOINTMENT (OUTPATIENT)
Dept: HEMATOLOGY ONCOLOGY | Facility: CLINIC | Age: 56
End: 2021-11-26

## 2021-11-26 ENCOUNTER — APPOINTMENT (OUTPATIENT)
Age: 56
End: 2021-11-26

## 2021-12-16 ENCOUNTER — APPOINTMENT (OUTPATIENT)
Age: 56
End: 2021-12-16

## 2024-02-15 NOTE — H&P PST ADULT - GENERAL
[Normal] : supple, no neck mass and thyroid not enlarged [Normal Neck Lymph Nodes] : normal neck lymph nodes  [Normal Supraclavicular Lymph Nodes] : normal supraclavicular lymph nodes [Normal Axillary Lymph Nodes] : normal axillary lymph nodes [Normal] : normal appearance, no rash, nodules, vesicles, ulcers, erythema [de-identified] : Groins not examined [de-identified] : See diagram details…